# Patient Record
Sex: MALE | Race: OTHER | HISPANIC OR LATINO | URBAN - METROPOLITAN AREA
[De-identification: names, ages, dates, MRNs, and addresses within clinical notes are randomized per-mention and may not be internally consistent; named-entity substitution may affect disease eponyms.]

---

## 2021-06-14 ENCOUNTER — EMERGENCY (EMERGENCY)
Facility: HOSPITAL | Age: 21
LOS: 1 days | Discharge: HOME | End: 2021-06-14
Attending: STUDENT IN AN ORGANIZED HEALTH CARE EDUCATION/TRAINING PROGRAM | Admitting: STUDENT IN AN ORGANIZED HEALTH CARE EDUCATION/TRAINING PROGRAM
Payer: SELF-PAY

## 2021-06-14 VITALS
HEIGHT: 66 IN | WEIGHT: 179.9 LBS | DIASTOLIC BLOOD PRESSURE: 89 MMHG | OXYGEN SATURATION: 97 % | HEART RATE: 90 BPM | SYSTOLIC BLOOD PRESSURE: 137 MMHG | TEMPERATURE: 98 F | RESPIRATION RATE: 18 BRPM

## 2021-06-14 LAB
ALBUMIN SERPL ELPH-MCNC: 4.8 G/DL — SIGNIFICANT CHANGE UP (ref 3.3–5.2)
ALP SERPL-CCNC: 124 U/L — HIGH (ref 40–120)
ALT FLD-CCNC: 68 U/L — HIGH
AMPHET UR-MCNC: NEGATIVE — SIGNIFICANT CHANGE UP
ANION GAP SERPL CALC-SCNC: 13 MMOL/L — SIGNIFICANT CHANGE UP (ref 5–17)
AST SERPL-CCNC: 61 U/L — HIGH
BARBITURATES UR SCN-MCNC: NEGATIVE — SIGNIFICANT CHANGE UP
BASOPHILS # BLD AUTO: 0.05 K/UL — SIGNIFICANT CHANGE UP (ref 0–0.2)
BASOPHILS NFR BLD AUTO: 0.9 % — SIGNIFICANT CHANGE UP (ref 0–2)
BENZODIAZ UR-MCNC: NEGATIVE — SIGNIFICANT CHANGE UP
BILIRUB SERPL-MCNC: <0.2 MG/DL — LOW (ref 0.4–2)
BUN SERPL-MCNC: 10.8 MG/DL — SIGNIFICANT CHANGE UP (ref 8–20)
CALCIUM SERPL-MCNC: 8.7 MG/DL — SIGNIFICANT CHANGE UP (ref 8.6–10.2)
CHLORIDE SERPL-SCNC: 109 MMOL/L — HIGH (ref 98–107)
CO2 SERPL-SCNC: 24 MMOL/L — SIGNIFICANT CHANGE UP (ref 22–29)
COCAINE METAB.OTHER UR-MCNC: NEGATIVE — SIGNIFICANT CHANGE UP
CREAT SERPL-MCNC: 0.91 MG/DL — SIGNIFICANT CHANGE UP (ref 0.5–1.3)
EOSINOPHIL # BLD AUTO: 0.18 K/UL — SIGNIFICANT CHANGE UP (ref 0–0.5)
EOSINOPHIL NFR BLD AUTO: 3.2 % — SIGNIFICANT CHANGE UP (ref 0–6)
ETHANOL SERPL-MCNC: 395 MG/DL — HIGH (ref 0–9)
GLUCOSE SERPL-MCNC: 100 MG/DL — HIGH (ref 70–99)
HCT VFR BLD CALC: 48.3 % — SIGNIFICANT CHANGE UP (ref 39–50)
HGB BLD-MCNC: 16.5 G/DL — SIGNIFICANT CHANGE UP (ref 13–17)
IMM GRANULOCYTES NFR BLD AUTO: 0.5 % — SIGNIFICANT CHANGE UP (ref 0–1.5)
LYMPHOCYTES # BLD AUTO: 2.05 K/UL — SIGNIFICANT CHANGE UP (ref 1–3.3)
LYMPHOCYTES # BLD AUTO: 36 % — SIGNIFICANT CHANGE UP (ref 13–44)
MCHC RBC-ENTMCNC: 33.5 PG — SIGNIFICANT CHANGE UP (ref 27–34)
MCHC RBC-ENTMCNC: 34.2 GM/DL — SIGNIFICANT CHANGE UP (ref 32–36)
MCV RBC AUTO: 98.2 FL — SIGNIFICANT CHANGE UP (ref 80–100)
METHADONE UR-MCNC: NEGATIVE — SIGNIFICANT CHANGE UP
MONOCYTES # BLD AUTO: 0.77 K/UL — SIGNIFICANT CHANGE UP (ref 0–0.9)
MONOCYTES NFR BLD AUTO: 13.5 % — SIGNIFICANT CHANGE UP (ref 2–14)
NEUTROPHILS # BLD AUTO: 2.62 K/UL — SIGNIFICANT CHANGE UP (ref 1.8–7.4)
NEUTROPHILS NFR BLD AUTO: 45.9 % — SIGNIFICANT CHANGE UP (ref 43–77)
OPIATES UR-MCNC: NEGATIVE — SIGNIFICANT CHANGE UP
PCP SPEC-MCNC: SIGNIFICANT CHANGE UP
PCP UR-MCNC: NEGATIVE — SIGNIFICANT CHANGE UP
PLATELET # BLD AUTO: 298 K/UL — SIGNIFICANT CHANGE UP (ref 150–400)
POTASSIUM SERPL-MCNC: 4.5 MMOL/L — SIGNIFICANT CHANGE UP (ref 3.5–5.3)
POTASSIUM SERPL-SCNC: 4.5 MMOL/L — SIGNIFICANT CHANGE UP (ref 3.5–5.3)
PROT SERPL-MCNC: 7.4 G/DL — SIGNIFICANT CHANGE UP (ref 6.6–8.7)
RBC # BLD: 4.92 M/UL — SIGNIFICANT CHANGE UP (ref 4.2–5.8)
RBC # FLD: 12 % — SIGNIFICANT CHANGE UP (ref 10.3–14.5)
SODIUM SERPL-SCNC: 146 MMOL/L — HIGH (ref 135–145)
THC UR QL: NEGATIVE — SIGNIFICANT CHANGE UP
WBC # BLD: 5.7 K/UL — SIGNIFICANT CHANGE UP (ref 3.8–10.5)
WBC # FLD AUTO: 5.7 K/UL — SIGNIFICANT CHANGE UP (ref 3.8–10.5)

## 2021-06-14 PROCEDURE — 70450 CT HEAD/BRAIN W/O DYE: CPT | Mod: 26,MA

## 2021-06-14 PROCEDURE — 72125 CT NECK SPINE W/O DYE: CPT | Mod: 26,MA

## 2021-06-14 PROCEDURE — 99285 EMERGENCY DEPT VISIT HI MDM: CPT

## 2021-06-14 RX ORDER — TETANUS TOXOID, REDUCED DIPHTHERIA TOXOID AND ACELLULAR PERTUSSIS VACCINE, ADSORBED 5; 2.5; 8; 8; 2.5 [IU]/.5ML; [IU]/.5ML; UG/.5ML; UG/.5ML; UG/.5ML
0.5 SUSPENSION INTRAMUSCULAR ONCE
Refills: 0 | Status: COMPLETED | OUTPATIENT
Start: 2021-06-14 | End: 2021-06-14

## 2021-06-14 RX ADMIN — TETANUS TOXOID, REDUCED DIPHTHERIA TOXOID AND ACELLULAR PERTUSSIS VACCINE, ADSORBED 0.5 MILLILITER(S): 5; 2.5; 8; 8; 2.5 SUSPENSION INTRAMUSCULAR at 20:12

## 2021-06-14 NOTE — ED PROVIDER NOTE - NEUROLOGICAL, MLM
(+)abrasion left forehead, no focal deficits, no motor or sensory deficits. no focal deficits, no motor deficits.

## 2021-06-14 NOTE — ED ADULT TRIAGE NOTE - CHIEF COMPLAINT QUOTE
BIBEMS found on the floor. +ETOH abuse. +AMS. Abrasion to forehead. C-collar placed on patient. MD at bedside. Priority CT activated.

## 2021-06-14 NOTE — ED PROVIDER NOTE - NSFOLLOWUPINSTRUCTIONS_ED_ALL_ED_FT
Abuso de alcohol    LO QUE NECESITA SABER:    ¿Qué es el abuso de alcohol?El abuso de alcohol significa que usted dani más de los límites diarios o semanales recomendados. Usted puede estar bebiendo alcohol regularmente o bebiendo grandes cantidades en un corto período (atracones de bebida). Usted sigue tomando, aun cuando esto le cause problemas legales, laborales o con aleisha relaciones.    ¿Qué necesito saber acerca de los límites recomendados de alcohol?  •Los hombres de 21 a 64 añosdeberían limitar el consumo de alcohol a 2 tragos al día. No tome más de 4 bebidas por día o más de 14 en heidy semana.      •Todos los hombres y las mujeres de 65 años o másdeberían limitar el consumo de alcohol a 1 trago por día. No tome más de 3 bebidas por día o más de 7 en heidy semana. Ninguna cantidad de alcohol se considera adecuada margie el embarazo.      ¿Cuáles son los signos y síntomas del abuso de alcohol?  •Pérdida de interés en aleisha actividades, trabajo y labor escolar      •Esconder alcohol o beber en privado      •Depresión o sentimiento de culpa por beber      •Pensamientos constantes acerca del alcohol      •Beber por la mañana para aliviar los efectos de la resaca      •No poder controlar la cantidad que se nigel      •Inquietud, comportamiento errático y violento      ¿Qué problemas de gigi puede causar el abuso del alcohol?  •Cáncer en hígado, páncreas, estómago, colon, riñón o mamas      •Derrame cerebral o ataque cardíaco      •Enfermedad hepática, renal o pulmonar      •Desmayos, pérdida de memoria, daño cerebral o demencia      •Diabetes, problemas del sistema inmunológico o deficiencia de tiamina (vitamina B1)      •Problemas para usted y dutton bebé si dani margie el embarazo      ¿Cómo se trata el abuso del alcohol?El tratamiento lo va a ayudar a comprender la razón por la cual usted abusa el alcohol. Los consejeros y terapeutas le van a proveer apoyo y lo van a ayudar a encontrar formas de afrontamiento en vez de annabella. Usted podría necesitar tratamiento con internación para proveerle un ambiente controlado. Usted podría necesitar tratamiento ambulatorio después de que dutton tratamiento hospitalario haya sido completado.  •La desintoxicaciónes un programa utilizado para eliminar el alcohol de dutton cuerpo. Margie la desintoxicación se administran medicamentos para ayudar a evitar los síntomas de abstinencia que se presentan cuando se suspende el consumo de alcohol.      •En la terapia de intervención breve,un profesional de la gigi lo ayuda a pensar de manera diferente sobre dutton consumo de alcohol. Daisy profesional también lo ayuda a fijar metas para disminuir dutton consumo de bebidas alcohólicas. La terapia puede continuar después de que sale del hospital.      •Suplementos vitamínicos, enrike B1, pueden ser necesarios. El consumo de alcohol puede dificultar la capacidad del organismo de absorber suficiente vitamina B1. Es posible que le den vitamina B1 si dutton nivel es bajo. También se administra para prevenir el daño cerebral por consumo de alcohol.      ¿Qué puedo hacer para manejar mi abuso de alcohol?  •Disminuya la cantidad que dani.[place] puede ayudar a prevenir problemas de gigi, enrike daño cerebral, cardíaco y hepático, presión arterial james, diabetes y cáncer. Si usted no puede dejar de beber por completo, los proveedores de atención médica pueden ayudarlo a establecer metas para disminuir la cantidad que usted dani.      •Planifique el uso semanal de alcohol.Es menos probable que ab más de lo recomendado si lo planifica con anticipación.      •Cuando ab alcohol, acompáñelo con comida.La comida evitará que el alcohol entre en dutton sistema demasiado rápido. Coma antes de annabella dutton primera bebida alcohólica.      •Calcule con cuidado el tiempo de aleisha bebidas.No tome más de heidy bebida por hora. [place] líquido, enrike agua, café o un refresco, entre las bebidas alcohólicas.      •No maneje si ha tomado alcohol.Asegúrese que alguien que no haya estado bebiendo lo pueda llevar a casa.      •No ab alcohol si está tomando daisy medicamento.El alcohol es peligroso cuando se combina con ciertos medicamentos, enrike acetaminofeno o un medicamento para la presión arterial. Hable con dutton médico acerca de todos los medicamentos que está tomando.      ¿Dónde puedo obtener apoyo y más información?  •Alcoholics Anonymous  Web Address: http://www.aa.org      •Substance Abuse and Mental Health Services Administration  [address]  [place] [address]  Web Address: http://www.samhsa.gov        Llame al número de emergencias local (911 en los [place]) en cualquiera de los siguientes casos:  •Tiene dolor en el pecho o dificultad para respirar de forma repentina.      •Usted quiere lastimarse o lastimar a otros.      •Usted sufre heidy convulsión o tiene temblores o estremecimientos.      ¿Cuándo luke llamar a mi médico?  •Usted tiene alucinaciones (ve o escucha cosas que no son reales).      •Usted no puede dejar de vomitar o vomita jesika.      •Usted necesita ayuda para dejar de annabella alcohol.      •Usted tiene preguntas o inquietudes acerca de dutton condición o cuidado.      ACUERDOS SOBRE DUTTON CUIDADO:    Usted tiene el derecho de ayudar a planear dutton cuidado. Aprenda todo lo que pueda sobre dutton condición y enrike darle tratamiento. Discuta aleisha opciones de tratamiento con aleisha médicos para decidir el cuidado que usted desea recibir. Usted siempre tiene el derecho de rechazar el tratamiento.      Traumatismo craneal    LO QUE NECESITA SABER:    ¿Qué necesito saber sobre un traumatismo craneal?Un traumatismo craneal puede incluir el cuero cabelludo, la verónica, el cráneo o el cerebro y puede variar de leve a grave. Los efectos pueden aparecer inmediatamente después de la lesión o desarrollarse más tarde. Los efectos pueden durar poco tiempo o ser permanentes. Es posible que los médicos quieran revisar dutton recuperación con el tiempo. El tratamiento puede cambiar a medida que usted se recupera o desarrolla nuevos problemas de gigi por el traumatismo craneal.    ¿Cuáles son los signos y síntomas de un traumatismo craneal?  •Heidy herida abierta en el cuero cabelludo o en la piel, hinchazón o moretones      •Dolor de glen leve a moderado      •Mareos o pérdida del equilibrio      •Náuseas o vómitos      •Zumbido en los oídos o dolor de floyd      •Confusión, especialmente después de la lesión.      •Cambios en el estado de ánimo, enrike sentirse inquieto o irritable      •Dificultad para pensar, recordar las cosas o concentrarse      •Somnolencia o disminución de la energía      •Dificultad para dormir      ¿Cómo se diagnostica un traumatismo craneal?  •Informe a dutton médico acerca de la lesión y aleisha síntomas. El médico le hará un examen para comprobar dutton función cerebral. Revisará la reacción de aleisha pupilas a la anisa. También revisará dutton memoria, la firmeza de aleisha ravindra y dutton equilibrio.      •Puede necesitar radiografías, heidy TC o heidy IRM para detectar sangrado o daños importantes en el cráneo o el cerebro. Es posible que le administren un líquido de contraste para que las imágenes se aprecien mejor. Dígale al médico si usted alguna vez ha tenido heidy reacción alérgica al líquido de contraste. No entre a la janet donde se realiza la resonancia magnética con algo de metal. El metal puede causar lesiones serias. Informe a dutton médico si usted tiene algún metal dentro o sobre dutton cuerpo.      ¿Cómo se trata un traumatismo craneal?Es posible que un traumatismo craneal leve no necesite tratamiento. Es posible que le den medicación para disminuir el dolor. Otros tratamientos pueden depender de la gravedad del traumatismo craneal. Heidy conmoción cerebral, un hematoma (acumulación de jesika) o un traumatismo craneoencefálico pueden requerir tratamiento inmediato y a juventino plazo.    ¿Cómo puedo controlar los síntomas?  •Descanseo randal actividades tranquilas. Limite dutton tiempo viendo la televisión, utilizando la computadora o realizando tareas que requieren mucho pensamiento. Regrese lentamente aleisha actividades acostumbradas enrike le indiquen. No practique deportes ni randal actividades que puedan provocarle un golpe en la glen. Pregunte a dutton médico cuándo puede regresar al deporte.      •Aplique hieloen la glen de 15 a 20 minutos cada hora o enrike se le indique. Use heidy compresa de hielo o ponga hielo triturado en heidy bolsa de plástico. Cúbralo con heidy toalla antes de aplicarlo sobre dutton piel. El hielo ayuda a evitar daño al tejido y a disminuir la inflamación y el dolor.      •Solicite que alguien se quede con usted por 24 horas, o enrike se le indique. Esta persona puede monitorearlo para detectar problemas y pedir ayuda si es necesario. Cuando se despierte, alejandro persona debe hacerle algunas preguntas cada pocas horas para burke si usted está pensando con claridad. Un ejemplo es preguntarle dutton nombre o dutton dirección.      ¿Qué puedo hacer para prevenir otro traumatismo craneal?  •Use un gagandeep que se ajuste correctamente.Randal esto cuando practica deportes, o earnest heidy bicicleta, scooter o patineta. Los cascos ayudan a disminuir el riesgo de un traumatismo craneal grave. Hable con dutton médico acerca de otras maneras en las que usted puede protegerse si practica deportes.      •Use el cinturón de seguridad cada vez que esté en un automóvil.[place] ayuda a disminuir el riesgo de sufrir un traumatismo craneal si tiene un accidente.      Llame al número local de emergencias (911 en los [place]), o pídale a alguien que llame si:  •No es posible despertarlo.      •Usted sufre heidy convulsión.      •Usted korey de reaccionar cuando le hablan o se desmaya.      •Usted tiene visión borrosa o doble.      •Usted arrastra las palabras o se confunde al hablar.      •Usted tiene debilidad en dutton brazo o pierna, pierde la sensación o presenta nuevos problemas de coordinación.      •Aleisha pupilas son más grandes de lo habitual o heidy pupila es de tamaño diferente de la otra.      •A usted le sale jesika o un líquido josue de los oídos o la nariz.      ¿Cuándo luke buscar atención inmediata?  •Usted tiene vómitos reiterados o tito.      •Se siente confundido.      •El dolor de glen empeora o se vuelve intenso.      •Usted o heidy persona que lo cuida nota que le lalo más despertarse que de costumbre.      ¿Cuándo luke llamar a mi médico?  •Aleisha síntomas hager más de 6 semanas después de la lesión.      •Usted tiene preguntas o inquietudes acerca de dutton condición o cuidado.      ACUERDOS SOBRE DUTTON CUIDADO:    Usted tiene el derecho de ayudar a planear dutton cuidado. Aprenda todo lo que pueda sobre dutton condición y enrike darle tratamiento. Discuta aleisha opciones de tratamiento con aleisha médicos para decidir el cuidado que usted desea recibir. Usted siempre tiene el derecho de rechazar el tratamiento.

## 2021-06-14 NOTE — ED PROVIDER NOTE - OBJECTIVE STATEMENT
28y male pt with no pmhx presents to ED BIBA c/o AMS. As per EMS, pt was found intoxicated, wandering in a parking lot. Pt was laying in dirt, incontinent. Pt has visible abrasion on his forehead.

## 2021-06-14 NOTE — ED PROVIDER NOTE - SKIN, MLM
Skin normal color for race, warm, dry and intact. No evidence of rash. Skin normal color for race, warm, dry and intact. No evidence of rash. (+)abrasion left forehead,

## 2021-06-14 NOTE — ED PROVIDER NOTE - UNABLE TO OBTAIN
(+)unable to obtain secondary to alcohol intoxication Severe Illness/Injury (+)unable to obtain secondary to ams

## 2021-06-15 VITALS
HEART RATE: 83 BPM | TEMPERATURE: 98 F | OXYGEN SATURATION: 98 % | RESPIRATION RATE: 18 BRPM | DIASTOLIC BLOOD PRESSURE: 72 MMHG | SYSTOLIC BLOOD PRESSURE: 121 MMHG

## 2021-06-15 PROCEDURE — 80053 COMPREHEN METABOLIC PANEL: CPT

## 2021-06-15 PROCEDURE — 82962 GLUCOSE BLOOD TEST: CPT

## 2021-06-15 PROCEDURE — G0378: CPT

## 2021-06-15 PROCEDURE — 80307 DRUG TEST PRSMV CHEM ANLYZR: CPT

## 2021-06-15 PROCEDURE — 90471 IMMUNIZATION ADMIN: CPT

## 2021-06-15 PROCEDURE — 70450 CT HEAD/BRAIN W/O DYE: CPT

## 2021-06-15 PROCEDURE — 99285 EMERGENCY DEPT VISIT HI MDM: CPT | Mod: 25

## 2021-06-15 PROCEDURE — 72125 CT NECK SPINE W/O DYE: CPT

## 2021-06-15 PROCEDURE — 36415 COLL VENOUS BLD VENIPUNCTURE: CPT

## 2021-06-15 PROCEDURE — 90715 TDAP VACCINE 7 YRS/> IM: CPT

## 2021-06-15 PROCEDURE — 99236 HOSP IP/OBS SAME DATE HI 85: CPT

## 2021-06-15 PROCEDURE — 36000 PLACE NEEDLE IN VEIN: CPT

## 2021-06-15 PROCEDURE — 85025 COMPLETE CBC W/AUTO DIFF WBC: CPT

## 2021-06-15 RX ADMIN — Medication 50 MILLIGRAM(S): at 02:26

## 2021-06-15 NOTE — ED CDU PROVIDER DISPOSITION NOTE - PATIENT PORTAL LINK FT
You can access the FollowMyHealth Patient Portal offered by [facility] by registering at the following website: [email or web address]. By joining [employer]’s FollowMyHealth portal, you will also be able to view your health information using other applications (apps) compatible with our system.

## 2021-06-15 NOTE — ED CDU PROVIDER INITIAL DAY NOTE - MEDICAL DECISION MAKING DETAILS
Pt placed in virtual obs pending sobriety. Truama w/u performed in the ED and negative. Pt resting comfortably in no distress currently

## 2021-06-15 NOTE — ED ADULT NURSE NOTE - OBJECTIVE STATEMENT
Patient BIBA with AMS.  Pt admits to drinking alcohol.  Pt received in yellow gown with belongings secured.   NAD noted, respirations even and unlabored.  Safety precautions in place.

## 2021-06-15 NOTE — ED CDU PROVIDER INITIAL DAY NOTE - UNABLE TO OBTAIN
(+)unable to obtain secondary to ams Severe Illness/Injury (+)unable to obtain secondary to alcohol intoxication

## 2024-01-06 NOTE — ED ADULT TRIAGE NOTE - BEFAST ARM NUMBNESS
- Started Lasix 20mg daily; was previously taking 20mg prn   - Likely due to consistent diuresis v CHF    129->131    Plan:   - Repeat BMP in 1 week   No

## 2024-06-21 NOTE — ED ADULT NURSE NOTE - NS PRO PASSIVE SMOKE EXP
"Patient Education   Preventive Care Advice   This is general advice we often give to help people stay healthy. Your care team may have specific advice just for you. Please talk to your care team about your own preventive care needs.  Lifestyle  Exercise at least 150 minutes each week (30 minutes a day, 5 days a week).  Do muscle strengthening activities 2 days a week. These help control your weight and prevent disease.  No smoking.  Wear sunscreen to prevent skin cancer.  Have your home tested for radon every 2 to 5 years. Radon is a colorless, odorless gas that can harm your lungs. To learn more, go to www.health.[facility].[place].us and search for \"Radon in Homes.\"  Keep guns unloaded and locked up in a safe place like a safe or gun vault, or, use a gun lock and hide the keys. Always lock away bullets separately. To learn more, visit [employer].[place].gov and search for \"safe gun storage.\"  Nutrition  Eat 5 or more servings of fruits and vegetables each day.  Try wheat bread, brown rice and whole grain pasta (instead of white bread, rice, and pasta).  Get enough calcium and vitamin D. Check the label on foods and aim for 100% of the RDA (recommended daily allowance).  Regular exams  Have a dental exam and cleaning every 6 months.  See your health care team every year to talk about:  Any changes in your health.  Any medicines your care team has prescribed.  Preventive care, family planning, and ways to prevent chronic diseases.  Shots (vaccines)   HPV shots (up to age 26), if you've never had them before.  Hepatitis B shots (up to age 59), if you've never had them before.  COVID-19 shot: Get this shot when it's due.  Flu shot: Get a flu shot every year.  Tetanus shot: Get a tetanus shot every 10 years.  Pneumococcal, hepatitis A, and RSV shots: Ask your care team if you need these based on your risk.  Shingles shot (for age 50 and up).  General health tests  Diabetes screening:  Starting at age 35, Get screened for diabetes at least " every 3 years.  If you are younger than age 35, ask your care team if you should be screened for diabetes.  Cholesterol test: At age 39, start having a cholesterol test every 5 years, or more often if advised.  Bone density scan (DEXA): At age 50, ask your care team if you should have this scan for osteoporosis (brittle bones).  Hepatitis C: Get tested at least once in your life.  Abdominal aortic aneurysm screening: Talk to your doctor about having this screening if you:  Have ever smoked; and  Are biologically male; and  Are between the ages of 65 and 75.  STIs (sexually transmitted infections)  Before age 24: Ask your care team if you should be screened for STIs.  After age 24: Get screened for STIs if you're at risk. You are at risk for STIs (including HIV) if:  You are sexually active with more than one person.  You don't use condoms every time.  You or a partner was diagnosed with a sexually transmitted infection.  If you are at risk for HIV, ask about PrEP medicine to prevent HIV.  Get tested for HIV at least once in your life, whether you are at risk for HIV or not.  Cancer screening tests  Cervical cancer screening: If you have a cervix, begin getting regular cervical cancer screening tests at age 21. Most people who have regular screenings with normal results can stop after age 65. Talk about this with your provider.  Breast cancer scan (mammogram): If you've ever had breasts, begin having regular mammograms starting at age 40. This is a scan to check for breast cancer.  Colon cancer screening: It is important to start screening for colon cancer at age 45.  Have a colonoscopy test every 10 years (or more often if you're at risk) Or, ask your provider about stool tests like a FIT test every year or Cologuard test every 3 years.  To learn more about your testing options, visit: www.[employer]/566115.pdf.  For help making a decision, visit: tito/lj16644.  Prostate cancer screening test: If you have a  prostate and are age 55 to 69, ask your provider if you would benefit from a yearly prostate cancer screening test.  Lung cancer screening: If you are a current or former smoker age 50 to 80, ask your care team if ongoing lung cancer screenings are right for you.  For informational purposes only. Not to replace the advice of your health care provider. Copyright   2023 [facility]. All rights reserved. Clinically reviewed by the [facility] Transitions Program. Globa.li [id number] - REV 04/24.  Learning About Stress  What is stress?     Stress is your body's response to a hard situation. Your body can have a physical, emotional, or mental response. Stress is a fact of life for most people, and it affects everyone differently. What causes stress for you may not be stressful for someone else.  A lot of things can cause stress. You may feel stress when you go on a job interview, take a test, or run a race. This kind of short-term stress is normal and even useful. It can help you if you need to work hard or react quickly. For example, stress can help you finish an important job on time.  Long-term stress is caused by ongoing stressful situations or events. Examples of long-term stress include long-term health problems, ongoing problems at work, or conflicts in your family. Long-term stress can harm your health.  How does stress affect your health?  When you are stressed, your body responds as though you are in danger. It makes hormones that speed up your heart, make you breathe faster, and give you a burst of energy. This is called the fight-or-flight stress response. If the stress is over quickly, your body goes back to normal and no harm is done.  But if stress happens too often or lasts too long, it can have bad effects. Long-term stress can make you more likely to get sick, and it can make symptoms of some diseases worse. If you tense up when you are stressed, you may develop neck, shoulder, or low  back pain. Stress is linked to high blood pressure and heart disease.  Stress also harms your emotional health. It can make you olvera, tense, or depressed. Your relationships may suffer, and you may not do well at work or school.  What can you do to manage stress?  You can try these things to help manage stress:   Do something active. Exercise or activity can help reduce stress. Walking is a great way to get started. Even everyday activities such as housecleaning or yard work can help.  Try yoga or lona chi. These techniques combine exercise and meditation. You may need some training at first to learn them.  Do something you enjoy. For example, listen to music or go to a movie. Practice your hobby or do volunteer work.  Meditate. This can help you relax, because you are not worrying about what happened before or what may happen in the future.  Do guided imagery. Imagine yourself in any setting that helps you feel calm. You can use online videos, books, or a teacher to guide you.  Do breathing exercises. For example:  From a standing position, bend forward from the waist with your knees slightly bent. Let your arms dangle close to the floor.  Breathe in slowly and deeply as you return to a standing position. Roll up slowly and lift your head last.  Hold your breath for just a few seconds in the standing position.  Breathe out slowly and bend forward from the waist.  Let your feelings out. Talk, laugh, cry, and express anger when you need to. Talking with supportive friends or family, a counselor, or a carito leader about your feelings is a healthy way to relieve stress. Avoid discussing your feelings with people who make you feel worse.  Write. It may help to write about things that are bothering you. This helps you find out how much stress you feel and what is causing it. When you know this, you can find better ways to cope.  What can you do to prevent stress?  You might try some of these things to help prevent  "stress:  Manage your time. This helps you find time to do the things you want and need to do.  Get enough sleep. Your body recovers from the stresses of the day while you are sleeping.  Get support. Your family, friends, and community can make a difference in how you experience stress.  Limit your news feed. Avoid or limit time on social media or news that may make you feel stressed.  Do something active. Exercise or activity can help reduce stress. Walking is a great way to get started.  Where can you learn more?  Go to https://www.[employer].net/patiented  Enter N032 in the search box to learn more about \"Learning About Stress.\"  Current as of: October 24, 2023               Content Version: 14.0    8997-6551 [email or web address].   Care instructions adapted under license by your healthcare professional. If you have questions about a medical condition or this instruction, always ask your healthcare professional. [email or web address] disclaims any warranty or liability for your use of this information.      Learning About Depression Screening  What is depression screening?  Depression screening is a way to see if you have depression symptoms. It may be done by a doctor or counselor. It's often part of a routine checkup. That's because your mental health is just as important as your physical health.  Depression is a mental health condition that affects how you feel, think, and act. You may:  Have less energy.  Lose interest in your daily activities.  Feel sad and grouchy for a long time.  Depression is very common. It affects people of all ages.  Many things can lead to depression. Some people become depressed after they have a stroke or find out they have a major illness like cancer or heart disease. The death of a loved one or a breakup may lead to depression. It can run in families. Most experts believe that a combination of inherited genes and stressful life events can cause it.  What happens during " "screening?  You may be asked to fill out a form about your depression symptoms. You and the doctor will discuss your answers. The doctor may ask you more questions to learn more about how you think, act, and feel.  What happens after screening?  If you have symptoms of depression, your doctor will talk to you about your options.  Doctors usually treat depression with medicines or counseling. Often, combining the two works best. Many people don't get help because they think that they'll get over the depression on their own. But people with depression may not get better unless they get treatment.  The cause of depression is not well understood. There may be many factors involved. But if you have depression, it's not your fault.  A serious symptom of depression is thinking about death or suicide. If you or someone you care about talks about this or about feeling hopeless, get help right away.  It's important to know that depression can be treated. Medicine, counseling, and self-care may help.  Where can you learn more?  Go to https://www.[employer].net/patiented  Enter T185 in the search box to learn more about \"Learning About Depression Screening.\"  Current as of: June 24, 2023               Content Version: 14.0    8477-5629 [employer].   Care instructions adapted under license by your healthcare professional. If you have questions about a medical condition or this instruction, always ask your healthcare professional. [employer] disclaims any warranty or liability for your use of this information.      Safer Sex: Care Instructions  Overview  Safer sex is a way to reduce your risk of getting a sexually transmitted infection (STI). It can also help prevent pregnancy.  Several products can help you practice safer sex and reduce your chance of STIs. One of the best is a condom. There are internal and external condoms. You can use a special rubber sheet (dental dam) for protection during oral " sex. Disposable gloves can keep your hands from touching blood, semen, or other body fluids that can carry infections.  Remember that birth control methods such as diaphragms, IUDs, foams, and birth control pills do not stop you from getting STIs.  Follow-up care is a key part of your treatment and safety. Be sure to make and go to all appointments, and call your doctor if you are having problems. It's also a good idea to know your test results and keep a list of the medicines you take.  How can you care for yourself at home?  Think about getting vaccinated to help prevent hepatitis A, hepatitis B, and human papillomavirus (HPV). They can be spread through sex.  Use a condom every time you have sex. Use an external condom, which goes on the penis. Or use an internal condom, which goes into the vagina or anus.  Make sure you use the right size external condom. A condom that's too small can break easily. A condom that's too big can slip off during sex.  Use a new condom each time you have sex. Be careful not to poke a hole in the condom when you open the wrapper.  Don't use an internal condom and an external condom at the same time.  Never use petroleum jelly (such as Vaseline), grease, hand lotion, baby oil, or anything with oil in it. These products can make holes in the condom.  After intercourse, hold the edge of the condom as you remove it. This will help keep semen from spilling out of the condom.  Do not have sex with anyone who has symptoms of an STI, such as sores on the genitals or mouth.  Do not drink a lot of alcohol or use drugs before sex.  Limit your sex partners. Sex with one partner who has sex only with you can reduce your risk of getting an STI.  Don't share sex toys. But if you do share them, use a condom and clean the sex toys between each use.  Talk to any partners before you have sex. Talk about what you feel comfortable with and whether you have any boundaries with sex. And find out if your  "partner or partners may be at risk for any STI. Keep in mind that a person may be able to spread an STI even if they do not have symptoms. You and any partners may want to get tested for STIs.  Where can you learn more?  Go to https://www.[employer].lt.net/patiented  Enter B608 in the search box to learn more about \"Safer Sex: Care Instructions.\"  Current as of: November 27, 2023               Content Version: 14.0    3719-7026 [employer].   Care instructions adapted under license by your healthcare professional. If you have questions about a medical condition or this instruction, always ask your healthcare professional. [employer] disclaims any warranty or liability for your use of this information.         " No

## 2024-08-23 NOTE — ED PROVIDER NOTE - ASSOCIATED PAIN OR INJURY
Hello,     Per protocol it is okay to hold medication during this timeframe, and no additional actions need to be taken during this time.     Thanks for reaching out!    BEN Solorzano   associated injury...

## 2024-10-09 NOTE — ED ADULT TRIAGE NOTE - RESPIRATORY RATE (BREATHS/MIN)
Caller: Ruddy Jackson    Relationship to patient: Self    Best call back number: [phone]     Chief complaint: PROSTHETIC NEEDS ORDER FOR REPLACEMENT     Type of visit: OFFICE     Requested date: NOT A MONDAY OR TUESDAY BUT NEEDS ANY OTHER DAY AND TIME       Additional notes: NEEDS TO GET IN ASAP FOR THIS AND DOESN'T HAVE ANYTHING UNTIL JANUARY WITH PCP. PLEASE CALL TO SEE IF WE CAN SQUEEZE HIM IN     
18